# Patient Record
Sex: MALE | Race: OTHER | NOT HISPANIC OR LATINO | URBAN - METROPOLITAN AREA
[De-identification: names, ages, dates, MRNs, and addresses within clinical notes are randomized per-mention and may not be internally consistent; named-entity substitution may affect disease eponyms.]

---

## 2023-01-20 ENCOUNTER — EMERGENCY (EMERGENCY)
Facility: HOSPITAL | Age: 23
LOS: 1 days | Discharge: HOME | End: 2023-01-20
Attending: EMERGENCY MEDICINE
Payer: MEDICAID

## 2023-01-20 VITALS
WEIGHT: 139.99 LBS | TEMPERATURE: 98 F | SYSTOLIC BLOOD PRESSURE: 117 MMHG | HEART RATE: 93 BPM | DIASTOLIC BLOOD PRESSURE: 79 MMHG | HEIGHT: 71 IN | OXYGEN SATURATION: 99 % | RESPIRATION RATE: 18 BRPM

## 2023-01-20 PROCEDURE — 99283 EMERGENCY DEPT VISIT LOW MDM: CPT

## 2023-01-20 PROCEDURE — 99282 EMERGENCY DEPT VISIT SF MDM: CPT

## 2023-01-20 RX ORDER — IBUPROFEN 200 MG
400 TABLET ORAL ONCE
Refills: 0 | Status: COMPLETED | OUTPATIENT
Start: 2023-01-20 | End: 2023-01-20

## 2023-01-20 RX ORDER — ACETAMINOPHEN 500 MG
650 TABLET ORAL ONCE
Refills: 0 | Status: COMPLETED | OUTPATIENT
Start: 2023-01-20 | End: 2023-01-20

## 2023-01-20 RX ADMIN — Medication 400 MILLIGRAM(S): at 19:04

## 2023-01-20 RX ADMIN — Medication 650 MILLIGRAM(S): at 19:04

## 2023-01-20 NOTE — ED PROVIDER NOTE - CLINICAL SUMMARY MEDICAL DECISION MAKING FREE TEXT BOX
Attending MD Cannon: 22-year-old gentleman presenting for evaluation of dental pain left lower jaw for 2 days.  He states that he has a cavity in that tooth that he knows about.  He has been taking an over-the-counter dental pain reliever that he can and cannot recall the name of.  He also placed a filling of the tooth that he received over-the-counter.  He denies any fevers or chills, there is no facial swelling there is no difficulty opening the mouth.  Patient states he brushes 2 times per day.  He has no other medical problems.  His pain is worse when he is lying flat.    On exam patient with normal vital signs, afebrile.  HEENT exam notable for no trismus.  There is no mandibular tenderness, there is no gingival swelling or fluctuance.  Tooth #19 has white filling in place, the tooth itself is not tender to palpation.  Posterior oropharynx is within normal limits.  There is significant caries of tooth #30.  Neck supple nontender no cervical lymphadenopathy.  Patient moves all extremity spontaneously normal affect.    Patient presenting with dental pain in the setting of significant caries of tooth #19.  Clinically there is no evidence of odontogenic infection at this time.  Patient instructed to continue oral analgesia.  Patient was advised that he needs to follow-up with a general dentist (information provided to patient) as soon as possible for definitive care which may include root canal versus extraction.  Patient advised on signs and symptoms of potential odontogenic infection that would require return to the emergency department.

## 2023-01-20 NOTE — ED PROVIDER NOTE - NSFOLLOWUPCLINICS_GEN_ALL_ED_FT
[facility]  Dental  [address] [place], [place] [id number]  Phone: [phone]  Fax:   Follow Up Time: 1-3 Days

## 2023-01-20 NOTE — ED PROVIDER NOTE - OBJECTIVE STATEMENT
21yo male pt, no pMHx, non smoker presents to ED with left lower dental pain for 2days. He placed a filling of the tooth that he received over-the-counter. Denies fever, chills, facial swelling, or swallowing problems.

## 2023-01-20 NOTE — ED PROVIDER NOTE - ANTIBIOTIC MEDICATION DECIDED AGAINST BECAUSE
Considered oral antibiotic however there is no evidence of clinical odontogenic infection and pain is more likely related to caries from tooth 19

## 2023-01-20 NOTE — ED PROVIDER NOTE - PHYSICAL EXAMINATION
NAD. VSS, Afebrile. No facial swelling. Normal ears. +Dental #19 with caries and tender. No fluctuating gum swelling. #30 dental decay/fx without tender or swelling. Normal throat. Neck supple. Lungs clear. Neuro- intact.

## 2023-01-20 NOTE — ED PROVIDER NOTE - NSFOLLOWUPINSTRUCTIONS_ED_ALL_ED_FT
You were seen in the emergency department for pain in the tooth of the lower jaw.  Examination did not show any infection of the tooth.  We suspect your pain is likely related to significant caries (cavity).    You may use Tylenol 650mg every 8 hours or Motrin 600mg every 8 hours as needed for pain. These are over the counter medications.    Observe a soft diet and avoid chewing on the left side.    Please follow-up with a dentist as soon as possible for definitive care of this tooth which may include a root canal or extraction of the tooth.  We have provided number that you should call on Monday morning, we will also reach out to you early next week if you have not establish follow-up to help you make an appointment.    Return to the emergency department if you develop progressive swelling of the left face, difficulty opening the mouth due to stiffness of the jaw or fevers as these are signs of infection of the tooth

## 2023-01-20 NOTE — ED ADULT NURSE NOTE - OBJECTIVE STATEMENT
PT is a 22 year old A&OX4 male with no significant PMH who presents to the ED from home with c/o dental pain for 2 days. PT states he does not follow with a dentist or have his teeth checked at all. PT states when this pain began he tried mouthwash from [facility] and Tylenol without relief. PT states "I filled my cavity myself with a kit." PT denies difficulty swallowing or eating, difficulty breathing, SOB, chest pain, and fevers at home. PT is resting comfortably in bed, breathing unlabored on room air, and speaking in complete sentences. No facial swelling noted. OTC filling noted to back left molar. PT has noted cavities to the right bottom molars as well. No signs of infection noted at this time. PT currently rates his pain at rest a 5/10 but states it worse when he lays down and tried to sleep. PT ambulatory with steady gait. Safety and comfort maintained. PT is a 22 year old A&OX4 male with no significant PMH who presents to the ED from home with c/o dental pain for 2 days. PT states he does not follow with a dentist or have his teeth checked at all. PT states when this pain began he tried mouthwash from [facility] and Tylenol without relief. PT states "I filled my cavity myself with a kit." PT denies difficulty swallowing or eating, difficulty breathing, SOB, chest pain, and fevers at home. PT is resting comfortably in bed, breathing unlabored on room air, and speaking in complete sentences. No facial swelling noted. OTC filling noted to back left molar. PT has noted cavities to the right bottom molars as well. No signs of infection noted at this time. PT currently rates his pain at rest a 5/10 but states it worse when he lays down and tries to sleep. PT ambulatory with steady gait. Safety and comfort maintained.

## 2023-01-20 NOTE — ED PROVIDER NOTE - PATIENT PORTAL LINK FT
You can access the FollowMyHealth Patient Portal offered by [facility] by registering at the following website: [email or web address]. By joining [employer]’s FollowMyHealth portal, you will also be able to view your health information using other applications (apps) compatible with our system.

## 2023-01-20 NOTE — ED PROVIDER NOTE - ATTENDING APP SHARED VISIT CONTRIBUTION OF CARE
Attending MD Cannon:  I personally have seen and examined this patient.  I have performed a substantive portion of the visit including all aspects of the medical decision making.   NP note reviewed and agree on plan of care and except where noted.          *The above represents an initial assessment/impression. Please refer to progress notes for potential changes in patient clinical course*